# Patient Record
Sex: FEMALE | HISPANIC OR LATINO
[De-identification: names, ages, dates, MRNs, and addresses within clinical notes are randomized per-mention and may not be internally consistent; named-entity substitution may affect disease eponyms.]

---

## 2021-03-08 ENCOUNTER — DASHBOARD ENCOUNTERS (OUTPATIENT)
Age: 61
End: 2021-03-08

## 2021-03-08 ENCOUNTER — OFFICE VISIT (OUTPATIENT)
Dept: URBAN - METROPOLITAN AREA CLINIC 78 | Facility: CLINIC | Age: 61
End: 2021-03-08
Payer: COMMERCIAL

## 2021-03-08 VITALS
TEMPERATURE: 97.2 F | HEIGHT: 63 IN | DIASTOLIC BLOOD PRESSURE: 85 MMHG | WEIGHT: 214.8 LBS | BODY MASS INDEX: 38.06 KG/M2 | RESPIRATION RATE: 16 BRPM | HEART RATE: 86 BPM | SYSTOLIC BLOOD PRESSURE: 142 MMHG

## 2021-03-08 DIAGNOSIS — Z12.11 SCREENING FOR COLON CANCER: ICD-10-CM

## 2021-03-08 DIAGNOSIS — M19.90 ARTHRITIS: ICD-10-CM

## 2021-03-08 DIAGNOSIS — K59.04 FUNCTIONAL CONSTIPATION: ICD-10-CM

## 2021-03-08 DIAGNOSIS — Z90.49 HISTORY OF CHOLECYSTECTOMY: ICD-10-CM

## 2021-03-08 PROBLEM — 3723001: Status: ACTIVE | Noted: 2021-03-08

## 2021-03-08 PROBLEM — 428882003: Status: ACTIVE | Noted: 2021-03-08

## 2021-03-08 PROBLEM — 162864005: Status: ACTIVE | Noted: 2021-03-08

## 2021-03-08 PROBLEM — 197118003: Status: ACTIVE | Noted: 2021-03-08

## 2021-03-08 PROCEDURE — 99203 OFFICE O/P NEW LOW 30 MIN: CPT | Performed by: INTERNAL MEDICINE

## 2021-03-08 RX ORDER — SODIUM, POTASSIUM,MAG SULFATES 17.5-3.13G
177 ML DAY 1 AND 177 ML DAY 2 SOLUTION, RECONSTITUTED, ORAL ORAL
Qty: 354 MILLILITER | Refills: 0 | OUTPATIENT
Start: 2021-03-08 | End: 2021-03-10

## 2021-03-08 NOTE — HPI-TODAY'S VISIT:
Patient was referred by Dr. Idalia Chan  A copy of this document will be sent to the physician.    The patient presents for a colon cancer screening. There is no family history of colon polyps or cancer. Patient denies change in bowel habits, appetite, and weight.  Last colonoscopy: > 5 years in [place]    Patient reports constipation: BM  1 /1-2 day   Constipation is fiber responsive  Occasional BRBPR on wiping  especially on wiping .  Denies chest pain or SOB Scheduled for stress test too ..routine April 7 2021    Personal hx of Cholecystectomy ( cholecystitis )   Taking medicine for back pain  She is going to see specialist next week  Hx of CoVID 19 in August 2020

## 2021-04-27 ENCOUNTER — OFFICE VISIT (OUTPATIENT)
Dept: URBAN - METROPOLITAN AREA SURGERY CENTER 15 | Facility: SURGERY CENTER | Age: 61
End: 2021-04-27
Payer: COMMERCIAL

## 2021-04-27 DIAGNOSIS — Z12.11 COLON CANCER SCREENING: ICD-10-CM

## 2021-04-27 PROCEDURE — G8907 PT DOC NO EVENTS ON DISCHARG: HCPCS | Performed by: INTERNAL MEDICINE

## 2021-04-27 PROCEDURE — G0121 COLON CA SCRN NOT HI RSK IND: HCPCS | Performed by: INTERNAL MEDICINE
